# Patient Record
Sex: FEMALE | Race: WHITE
[De-identification: names, ages, dates, MRNs, and addresses within clinical notes are randomized per-mention and may not be internally consistent; named-entity substitution may affect disease eponyms.]

---

## 2018-09-18 ENCOUNTER — HOSPITAL ENCOUNTER (EMERGENCY)
Dept: HOSPITAL 25 - UCEAST | Age: 18
Discharge: HOME | End: 2018-09-18
Payer: COMMERCIAL

## 2018-09-18 VITALS — DIASTOLIC BLOOD PRESSURE: 86 MMHG | SYSTOLIC BLOOD PRESSURE: 126 MMHG

## 2018-09-18 DIAGNOSIS — L03.213: Primary | ICD-10-CM

## 2018-09-18 PROCEDURE — 99202 OFFICE O/P NEW SF 15 MIN: CPT

## 2018-09-18 PROCEDURE — G0463 HOSPITAL OUTPT CLINIC VISIT: HCPCS

## 2018-09-18 NOTE — UC
Eye Complaint HPI





- HPI Summary


HPI Summary: 





SWOLLEN RIGHT UPPER EYELID X 1 DAY


WOKE UP THIS MORNING WITH THE SWELLING AND REDNESS OF RIGHT UPPER EYELID


NO EYE PAIN , NO CHANGE IN VISION , NO EYE DISCHARGE, NO FEVER, NO CHILLS


NO KNOWN INSECT BITES





- History of Current Complaint


Chief Complaint: UCEye


Stated Complaint: EYELID COMPLAINT


Time Seen by Provider: 09/18/18 18:49


Hx Obtained From: Patient


Hx Last Menstrual Period: iud


Pregnant?: No


Onset/Duration: Gradual Onset, Lasting Days - 1, Still Present


Timing: Constant


Severity Initially: Mild


Severity Currently: Moderate


Pain Intensity: 0


Location of Injury: Eye Lid (upper) - RIHT SIDE


Aggravating Factor(s): Nothing


Alleviating Factor(s): Nothing


Associated Signs And Symptoms: Positive: Swelling - RIGHT UPPER EYELID.  

Negative: Photophobia, Drainage (Clear), Drainage (Purulent), Vision Impairment 

Bilateral, Vision Impairment Right, Vision Impairment Left, Fever





- Allergies/Home Medications


Allergies/Adverse Reactions: 


 Allergies











Allergy/AdvReac Type Severity Reaction Status Date / Time


 


No Known Allergies Allergy   Verified 09/18/18 18:29














PMH/Surg Hx/FS Hx/Imm Hx


Previously Healthy: Yes





- Surgical History


Surgical History: None


Surgery Procedure, Year, and Place: denies





- Family History


Known Family History: 


   Negative: Diabetes





- Social History


Alcohol Use: Occasionally


Substance Use Type: None


Smoking Status (MU): Never Smoked Tobacco





Review of Systems


Constitutional: Negative


Skin: Negative


ENT: Negative


Respiratory: Negative


Cardiovascular: Negative


Gastrointestinal: Negative


Genitourinary: Negative


Is Patient Immunocompromised?: No


All Other Systems Reviewed And Are Negative: Yes





Physical Exam


Triage Information Reviewed: Yes


Appearance: Well-Appearing, No Pain Distress, Well-Nourished


Vital Signs: 


 Initial Vital Signs











Temp  99.1 F   09/18/18 18:22


 


Pulse  97   09/18/18 18:22


 


Resp  20   09/18/18 18:22


 


BP  126/86   09/18/18 18:22


 


Pulse Ox  100   09/18/18 18:22











Vital Signs Reviewed: Yes


Eye Exam: Normal


Eyes: Positive: Conjunctiva Clear, Other: - RIGHT UPPER EYELID: + ERYTHEMA, 

SWELLING, NO TENDERNESS,


ENT Exam: Normal


ENT: Positive: Normal ENT inspection, Hearing grossly normal, Pharynx normal


Neck exam: Normal


Neck: Positive: Supple, Nontender, No Lymphadenopathy


Respiratory: Positive: Chest non-tender, Lungs clear, Normal breath sounds


Cardiovascular: Positive: RRR, No Murmur, Pulses Normal





Eye Complaint Course/Dx





- Differential Dx/Diagnosis


Provider Diagnoses: PERIORBITAL CELLULITIS





Discharge





- Sign-Out/Discharge


Documenting (check all that apply): Patient Departure


All imaging exams completed and their final reports reviewed: No Studies





- Discharge Plan


Condition: Stable


Disposition: HOME


Prescriptions: 


Cephalexin CAP* [Keflex CAP*] 500 mg PO TID #30 cap


Patient Education Materials:  Periorbital Cellulitis in Adults (ED)


[facility] 


[facility]


Additional Instructions: 


CELLULITIS VS INSECT BITE 








- Billing Disposition and Condition


Condition: STABLE


Disposition: Home

## 2018-09-19 ENCOUNTER — HOSPITAL ENCOUNTER (EMERGENCY)
Dept: HOSPITAL 25 - UCEAST | Age: 18
Discharge: HOME | End: 2018-09-19
Payer: COMMERCIAL

## 2018-09-19 VITALS — DIASTOLIC BLOOD PRESSURE: 72 MMHG | SYSTOLIC BLOOD PRESSURE: 107 MMHG

## 2018-09-19 DIAGNOSIS — Z51.89: Primary | ICD-10-CM

## 2018-09-19 DIAGNOSIS — L03.213: ICD-10-CM

## 2018-09-19 PROCEDURE — 99212 OFFICE O/P EST SF 10 MIN: CPT

## 2018-09-19 PROCEDURE — G0463 HOSPITAL OUTPT CLINIC VISIT: HCPCS

## 2018-09-19 NOTE — UC
Skin Complaint HPI





- HPI Summary


HPI Summary: 





19 yo female presents with RIGHT eye swelling. She tells me that she was seen 

here yesterday for what was dx'd as periorbital cellulitis. She was given 

keflex and advised to return if her symptoms did not improve. She is here today 

because she thinks her swelling a little worse than yesterday. She is able to 

open her eye and has no vision changes. She does not wear contact and denies 

injury. Denies fever or chills





- History of Current Complaint


Chief Complaint: UCEye


Time Seen by Provider: 09/19/18 11:08


Stated Complaint: RECHECK EYE ISSUE


Hx Obtained From: Patient


Hx Last Menstrual Period: bcp


Onset/Duration: Sudden Onset


Skin Exposure Onset/Duration: Hours Ago


Onset Severity: Mild


Current Severity: Mild


Pain Intensity: 2


Pain Scale Used: 0-10 Numeric





- Allergy/Home Medications


Allergies/Adverse Reactions: 


 Allergies











Allergy/AdvReac Type Severity Reaction Status Date / Time


 


No Known Allergies Allergy   Verified 09/19/18 10:50














Review of Systems


Constitutional: Negative


Skin: Negative


Eyes: Other - Right eye swelling


ENT: Negative


Respiratory: Negative


Cardiovascular: Negative


Neurovascular: Negative


Neurological: Negative


Psychological: Negative


All Other Systems Reviewed And Are Negative: Yes





PMH/Surg Hx/FS Hx/Imm Hx





- Additional Past Medical History


Additional PMH: 





None


Previously Healthy: Yes





- Surgical History


Surgical History: None


Surgery Procedure, Year, and Place: denies





- Family History


Known Family History: 


   Negative: Diabetes





- Social History


Occupation: Student


Lives: Dormitory/Roommates


Alcohol Use: Occasionally


Substance Use Type: None


Smoking Status (MU): Never Smoked Tobacco





Physical Exam





- Summary


Physical Exam Summary: 





GENERAL: WDWN. No pain distress.


SKIN: No rashes, sores, lesions, or open wounds.


HEENT:


            Head: AT/NC


            Eyes: EOM intact without pain. PERRLA. RIGHT EYE: Moderate 

periorbital edema with mild erythema. NTTP. No scleral injection or discharge. 

Conjunctiva without erythema or inflammation. She is able to actively open her 

right eye without pain. RIGHT EYE: Conjunctiva clear without inflammation or 

discharge. No FBs appreciated


            Nose: NTTP maxillary and frontal sinus. 


NECK: Supple. Nontender. No lymphadenopathy. 


CHEST:  No accessory muscle use. Breathing comfortably and in no distress.


CV:  Pulses intact. Cap refill <2seconds


NEURO: Alert.


PSYCH: Age appropriate behavior.





Triage Information Reviewed: Yes


Vital Signs: 


 Initial Vital Signs











Temp  98.2 F   09/19/18 10:47


 


Pulse  67   09/19/18 10:47


 


Resp  16   09/19/18 10:47


 


BP  107/72   09/19/18 10:47


 


Pulse Ox  100   09/19/18 10:47














Course/Dx





- Course


Course Of Treatment: Periorbital cellulitis of right eye. She is afebrile, site 

is NTTP, and can open her eye without pain. Will stop her keflex and start her 

on augmentin and prednisone. She was given 4mg of dexamethasone in the clinical 

course. Advised to f/u tomorrow if symptoms have no change or worsen.





- Diagnoses


Provider Diagnoses: Right periorbital cellulitis





Discharge





- Sign-Out/Discharge


Documenting (check all that apply): Patient Departure


All imaging exams completed and their final reports reviewed: No Studies





- Discharge Plan


Condition: Stable


Disposition: HOME


Prescriptions: 


Amoxicillin/Clavulanate TAB* [Augmentin *] 875 mg PO BID #20 tab


predniSONE TAB* [Deltasone 20 MG TAB*] 20 mg PO BID #10 tab


Patient Education Materials:  Periorbital Cellulitis in Adults (ED)


Referrals: 


[facility] [Primary Care Provider] - 


Additional Instructions: 


If you develop a fever, shortness of breath, chest pain, new or worsening 

symptoms - please call your PCP or go to the ED.


 


1) If your eye does not improve or is worse tomorrow please return to Urgent 

Care for further evaluation





- Billing Disposition and Condition


Condition: STABLE


Disposition: Home